# Patient Record
Sex: FEMALE | Race: WHITE | NOT HISPANIC OR LATINO | URBAN - METROPOLITAN AREA
[De-identification: names, ages, dates, MRNs, and addresses within clinical notes are randomized per-mention and may not be internally consistent; named-entity substitution may affect disease eponyms.]

---

## 2021-03-09 ENCOUNTER — OFFICE VISIT (OUTPATIENT)
Dept: URBAN - METROPOLITAN AREA CLINIC 72 | Facility: CLINIC | Age: 56
End: 2021-03-09

## 2021-03-09 NOTE — HPI-TODAY'S VISIT:
Ms. Marshall is a 55-year-old female who presents for consultation for ulcerative colitis, she was referred by Guille Zaman PA-C.  Her past medical history is notable for ulcerative colitis, anxiety, hiatal hernia and GERD.  She is currently maintained on omeprazole 40 mg daily and mesalamine 2.4 g daily.  Lab work from PCP to 4/20/21 Hemoglobin 10.8 (11.5 2/2019) Platelet 408  normal CMP

## 2021-10-19 ENCOUNTER — OFFICE VISIT (OUTPATIENT)
Dept: URBAN - METROPOLITAN AREA CLINIC 72 | Facility: CLINIC | Age: 56
End: 2021-10-19

## 2021-10-19 PROBLEM — 64766004: Status: ACTIVE | Noted: 2021-03-09

## 2021-11-09 ENCOUNTER — OFFICE VISIT (OUTPATIENT)
Dept: URBAN - METROPOLITAN AREA CLINIC 72 | Facility: CLINIC | Age: 56
End: 2021-11-09

## 2021-11-09 RX ORDER — ESTRADIOL 1 MG/1
1 TABLET TABLET ORAL ONCE A DAY
Status: ACTIVE | COMMUNITY

## 2021-11-09 RX ORDER — MESALAMINE 1.2 G/1
2 TABLETS TABLET, DELAYED RELEASE ORAL ONCE A DAY
Status: ACTIVE | COMMUNITY

## 2021-11-09 RX ORDER — BACLOFEN 10 MG/1
1 TABLET AS NEEDED TABLET ORAL TWICE A DAY
Status: ACTIVE | COMMUNITY

## 2021-11-09 RX ORDER — VENLAFAXINE HYDROCHLORIDE 75 MG/1
1 TABLET WITH FOOD TABLET ORAL ONCE A DAY
Status: ACTIVE | COMMUNITY

## 2021-11-09 RX ORDER — OMEPRAZOLE 40 MG/1
1 CAPSULE 30 MINUTES BEFORE MORNING MEAL CAPSULE, DELAYED RELEASE ORAL ONCE A DAY
Status: ACTIVE | COMMUNITY

## 2021-11-09 RX ORDER — LOVASTATIN 20 MG/1
1 TABLET WITH THE EVENING MEAL TABLET ORAL ONCE A DAY
Status: ACTIVE | COMMUNITY

## 2021-11-09 NOTE — HPI-TODAY'S VISIT:
Mrs. Marshall is a 56-year-old female who was referred by TRAYC Blas for colon cancer screening history of ulcerative colitis and GERD.  She has a past medical history of depression, hyperlipidemia and GERD.  Also has hiatal hernia status post cholecystectomy.

## 2022-04-05 ENCOUNTER — OFFICE VISIT (OUTPATIENT)
Dept: URBAN - METROPOLITAN AREA CLINIC 113 | Facility: CLINIC | Age: 57
End: 2022-04-05

## 2022-04-05 RX ORDER — OMEPRAZOLE 40 MG/1
1 CAPSULE 30 MINUTES BEFORE MORNING MEAL CAPSULE, DELAYED RELEASE ORAL ONCE A DAY
Status: ACTIVE | COMMUNITY

## 2022-04-05 RX ORDER — MESALAMINE 1.2 G/1
2 TABLETS TABLET, DELAYED RELEASE ORAL ONCE A DAY
Status: ACTIVE | COMMUNITY

## 2022-04-05 RX ORDER — VENLAFAXINE HYDROCHLORIDE 75 MG/1
1 TABLET WITH FOOD TABLET ORAL ONCE A DAY
Status: ACTIVE | COMMUNITY

## 2022-04-05 RX ORDER — LOVASTATIN 20 MG/1
1 TABLET WITH THE EVENING MEAL TABLET ORAL ONCE A DAY
Status: ACTIVE | COMMUNITY

## 2022-04-05 RX ORDER — BACLOFEN 10 MG/1
1 TABLET AS NEEDED TABLET ORAL TWICE A DAY
Status: ACTIVE | COMMUNITY

## 2022-04-05 RX ORDER — ESTRADIOL 1 MG/1
1 TABLET TABLET ORAL ONCE A DAY
Status: ACTIVE | COMMUNITY

## 2022-05-24 ENCOUNTER — OFFICE VISIT (OUTPATIENT)
Dept: URBAN - METROPOLITAN AREA CLINIC 72 | Facility: CLINIC | Age: 57
End: 2022-05-24

## 2022-05-24 RX ORDER — ESTRADIOL 1 MG/1
1 TABLET TABLET ORAL ONCE A DAY
Status: ACTIVE | COMMUNITY

## 2022-05-24 RX ORDER — BACLOFEN 10 MG/1
1 TABLET AS NEEDED TABLET ORAL TWICE A DAY
Status: ACTIVE | COMMUNITY

## 2022-05-24 RX ORDER — VENLAFAXINE HYDROCHLORIDE 75 MG/1
1 TABLET WITH FOOD TABLET ORAL ONCE A DAY
Status: ACTIVE | COMMUNITY

## 2022-05-24 RX ORDER — MESALAMINE 1.2 G/1
2 TABLETS TABLET, DELAYED RELEASE ORAL ONCE A DAY
Status: ACTIVE | COMMUNITY

## 2022-05-24 RX ORDER — LOVASTATIN 20 MG/1
1 TABLET WITH THE EVENING MEAL TABLET ORAL ONCE A DAY
Status: ACTIVE | COMMUNITY

## 2022-05-24 RX ORDER — OMEPRAZOLE 40 MG/1
1 CAPSULE 30 MINUTES BEFORE MORNING MEAL CAPSULE, DELAYED RELEASE ORAL ONCE A DAY
Status: ACTIVE | COMMUNITY

## 2022-05-24 NOTE — HPI-TODAY'S VISIT:
Mrs. Marshall is a pleasant 57-year-old who presents as a new patient for consultation for GERD.  She was referred by Dr. Cliff Ndiaye, a copy of this consultation will be forwarded to the referring physician.  She has a past medical history of reflux, hypertension and chronic abdominal pain he has been maintained on pantoprazole.

## 2022-07-20 ENCOUNTER — OFFICE VISIT (OUTPATIENT)
Dept: URBAN - METROPOLITAN AREA CLINIC 72 | Facility: CLINIC | Age: 57
End: 2022-07-20

## 2022-07-20 RX ORDER — ESTRADIOL 1 MG/1
1 TABLET TABLET ORAL ONCE A DAY
Status: ACTIVE | COMMUNITY

## 2022-07-20 RX ORDER — BACLOFEN 10 MG/1
1 TABLET AS NEEDED TABLET ORAL TWICE A DAY
Status: ACTIVE | COMMUNITY

## 2022-07-20 RX ORDER — LOVASTATIN 20 MG/1
1 TABLET WITH THE EVENING MEAL TABLET ORAL ONCE A DAY
Status: ACTIVE | COMMUNITY

## 2022-07-20 RX ORDER — MESALAMINE 1.2 G/1
2 TABLETS TABLET, DELAYED RELEASE ORAL ONCE A DAY
Status: ACTIVE | COMMUNITY

## 2022-07-20 RX ORDER — VENLAFAXINE HYDROCHLORIDE 75 MG/1
1 TABLET WITH FOOD TABLET ORAL ONCE A DAY
Status: ACTIVE | COMMUNITY

## 2022-07-20 RX ORDER — OMEPRAZOLE 40 MG/1
1 CAPSULE 30 MINUTES BEFORE MORNING MEAL CAPSULE, DELAYED RELEASE ORAL ONCE A DAY
Status: ACTIVE | COMMUNITY

## 2023-05-17 ENCOUNTER — OFFICE VISIT (OUTPATIENT)
Dept: URBAN - METROPOLITAN AREA CLINIC 72 | Facility: CLINIC | Age: 58
End: 2023-05-17

## 2023-06-01 ENCOUNTER — WEB ENCOUNTER (OUTPATIENT)
Dept: URBAN - METROPOLITAN AREA CLINIC 72 | Facility: CLINIC | Age: 58
End: 2023-06-01

## 2023-06-01 ENCOUNTER — DASHBOARD ENCOUNTERS (OUTPATIENT)
Age: 58
End: 2023-06-01

## 2023-06-01 ENCOUNTER — LAB OUTSIDE AN ENCOUNTER (OUTPATIENT)
Dept: URBAN - METROPOLITAN AREA CLINIC 72 | Facility: CLINIC | Age: 58
End: 2023-06-01

## 2023-06-01 ENCOUNTER — OFFICE VISIT (OUTPATIENT)
Dept: URBAN - METROPOLITAN AREA CLINIC 72 | Facility: CLINIC | Age: 58
End: 2023-06-01
Payer: COMMERCIAL

## 2023-06-01 VITALS
TEMPERATURE: 97.3 F | HEIGHT: 65 IN | DIASTOLIC BLOOD PRESSURE: 67 MMHG | SYSTOLIC BLOOD PRESSURE: 118 MMHG | HEART RATE: 75 BPM | WEIGHT: 218 LBS | BODY MASS INDEX: 36.32 KG/M2

## 2023-06-01 DIAGNOSIS — K22.4 ESOPHAGEAL SPASM: ICD-10-CM

## 2023-06-01 DIAGNOSIS — K51.80 CHRONIC PANCOLONIC ULCERATIVE COLITIS: ICD-10-CM

## 2023-06-01 DIAGNOSIS — K21.00 GASTROESOPHAGEAL REFLUX DISEASE WITH ESOPHAGITIS WITHOUT HEMORRHAGE: ICD-10-CM

## 2023-06-01 DIAGNOSIS — D50.8 ACQUIRED IRON DEFICIENCY ANEMIA DUE TO DECREASED ABSORPTION: ICD-10-CM

## 2023-06-01 PROBLEM — 266433003: Status: ACTIVE | Noted: 2023-06-01

## 2023-06-01 PROBLEM — 266434009: Status: ACTIVE | Noted: 2023-06-01

## 2023-06-01 PROBLEM — 87522002: Status: ACTIVE | Noted: 2023-06-01

## 2023-06-01 PROCEDURE — 99204 OFFICE O/P NEW MOD 45 MIN: CPT | Performed by: INTERNAL MEDICINE

## 2023-06-01 RX ORDER — VENLAFAXINE HYDROCHLORIDE 75 MG/1
1 TABLET WITH FOOD TABLET ORAL ONCE A DAY
Status: ACTIVE | COMMUNITY

## 2023-06-01 RX ORDER — BACLOFEN 10 MG/1
1 TABLET AS NEEDED TABLET ORAL TWICE A DAY
Qty: 180 | Refills: 1

## 2023-06-01 RX ORDER — LOVASTATIN 20 MG/1
1 TABLET WITH THE EVENING MEAL TABLET ORAL ONCE A DAY
Status: ACTIVE | COMMUNITY

## 2023-06-01 RX ORDER — OMEPRAZOLE 40 MG/1
1 CAPSULE 30 MINUTES BEFORE MORNING AND EVENING MEAL CAPSULE, DELAYED RELEASE ORAL TWICE A DAY
Qty: 180 | Refills: 1

## 2023-06-01 RX ORDER — OMEPRAZOLE 40 MG/1
1 CAPSULE 30 MINUTES BEFORE MORNING MEAL CAPSULE, DELAYED RELEASE ORAL ONCE A DAY
Status: ACTIVE | COMMUNITY

## 2023-06-01 RX ORDER — TIZANIDINE 4 MG/1
TABLET ORAL
Qty: 30 TABLET | Status: ON HOLD | COMMUNITY

## 2023-06-01 RX ORDER — VENLAFAXINE HYDROCHLORIDE 75 MG/1
CAPSULE, EXTENDED RELEASE ORAL
Qty: 90 CAPSULE | Status: ACTIVE | COMMUNITY

## 2023-06-01 RX ORDER — MESALAMINE 1.2 G/1
TABLET, DELAYED RELEASE ORAL
Qty: 60 TABLET | Status: ON HOLD | COMMUNITY

## 2023-06-01 RX ORDER — MESALAMINE 1.2 G/1
2 TABLETS TABLET, DELAYED RELEASE ORAL ONCE A DAY
Qty: 180 | Refills: 1

## 2023-06-01 RX ORDER — PREDNISONE 10 MG/1
TABLET ORAL
Qty: 48 TABLET | Status: ON HOLD | COMMUNITY

## 2023-06-01 RX ORDER — MESALAMINE 1.2 G/1
2 TABLETS TABLET, DELAYED RELEASE ORAL ONCE A DAY
Status: ACTIVE | COMMUNITY

## 2023-06-01 RX ORDER — ESTRADIOL 1 MG/1
1 TABLET TABLET ORAL ONCE A DAY
Status: ACTIVE | COMMUNITY

## 2023-06-01 RX ORDER — BACLOFEN 10 MG/1
1 TABLET AS NEEDED TABLET ORAL TWICE A DAY
Status: ACTIVE | COMMUNITY

## 2023-06-01 NOTE — HPI-TODAY'S VISIT:
58-year-old who referred by Dr. Campoverde 3/22/2023 for history of ulcerative colitis.  Also has AMY and GERD.   A copy of this note will be sent to the referring provider.  Originally had an appointment 3/9/2021.  Was a No-show to that appointment and has had 4 no-shows since into appointments rescheduled.  Her past medical history is notable for ulcerative colitis, anxiety, hiatal hernia and GERD.  She is currently maintained on omeprazole 40 mg daily and mesalamine 2.4 g daily.  Labs 3/9/2023 iron saturation 5.4%, total iron 25, hemoglobin 9.8, MCV 88.9 and platelet 444.  CMP: Normal.  Ferritin low at 4.1.  TSH normal.  B12 normal.  Lipid panel: Triglycerides 221, HDL 57 otherwise normal Labs 5/24/2023.  CBC: Hgb 12.2, WBC 12.35, RDW 16.5, neutrophils 9.  Previously seeing Dr. Keith.  Last EGD/colon 5 years ago.  Hx of Barretts 20 years ago.    On interview today she denies hematochezia and melena but reports some mucous.  2-5 BM's a day Type 4-6 on the [place] scale.  Has some upper abdominal discomfort more central and LUQ which she states has been consistent with her areas of UC in the past.  Does have some issues waking up at night feeling like she is choking.  She does sleep with her head upright.  Takes Baclofen for esophageal spasms.  Has had iron infusions recently.  No CP, SOB, palpitations, syncope, near-syncope or problems with anesthesia previously.

## 2023-08-16 ENCOUNTER — TELEPHONE ENCOUNTER (OUTPATIENT)
Dept: URBAN - METROPOLITAN AREA CLINIC 72 | Facility: CLINIC | Age: 58
End: 2023-08-16

## 2023-08-21 ENCOUNTER — OFFICE VISIT (OUTPATIENT)
Dept: URBAN - METROPOLITAN AREA MEDICAL CENTER 40 | Facility: MEDICAL CENTER | Age: 58
End: 2023-08-21

## 2023-08-21 ENCOUNTER — CLAIMS CREATED FROM THE CLAIM WINDOW (OUTPATIENT)
Dept: URBAN - METROPOLITAN AREA MEDICAL CENTER 40 | Facility: MEDICAL CENTER | Age: 58
End: 2023-08-21
Payer: COMMERCIAL

## 2023-08-21 DIAGNOSIS — K29.60 OTHER GASTRITIS WITHOUT BLEEDING: ICD-10-CM

## 2023-08-21 DIAGNOSIS — K51.90 ULCERATIVE COLITIS WITHOUT COMPLICATIONS, UNSPECIFIED LOCATION: ICD-10-CM

## 2023-08-21 DIAGNOSIS — K31.7 GASTRIC POLYPS: ICD-10-CM

## 2023-08-21 DIAGNOSIS — K63.5 BENIGN COLON POLYP: ICD-10-CM

## 2023-08-21 DIAGNOSIS — K21.9 ACID REFLUX: ICD-10-CM

## 2023-08-21 DIAGNOSIS — K22.4 ESOPHAGEAL SPASM: ICD-10-CM

## 2023-08-21 DIAGNOSIS — K52.89 OTHER AND UNSPECIFIED NONINFECTIOUS GASTROENTERITIS AND COLITIS: ICD-10-CM

## 2023-08-21 DIAGNOSIS — K51.40 INFLAMMATORY POLYP OF COLON: ICD-10-CM

## 2023-08-21 DIAGNOSIS — D50.9 IRON DEFICIENCY ANEMIA, UNSPECIFIED IRON DEFICIENCY ANEMIA TYPE: ICD-10-CM

## 2023-08-21 PROCEDURE — 43239 EGD BIOPSY SINGLE/MULTIPLE: CPT | Performed by: INTERNAL MEDICINE

## 2023-08-21 PROCEDURE — 45380 COLONOSCOPY AND BIOPSY: CPT | Performed by: INTERNAL MEDICINE

## 2023-08-21 RX ORDER — VENLAFAXINE HYDROCHLORIDE 75 MG/1
1 TABLET WITH FOOD TABLET ORAL ONCE A DAY
Status: ACTIVE | COMMUNITY

## 2023-08-21 RX ORDER — VENLAFAXINE HYDROCHLORIDE 75 MG/1
CAPSULE, EXTENDED RELEASE ORAL
Qty: 90 CAPSULE | Status: ACTIVE | COMMUNITY

## 2023-08-21 RX ORDER — BACLOFEN 10 MG/1
1 TABLET AS NEEDED TABLET ORAL TWICE A DAY
Qty: 180 | Refills: 1 | Status: ACTIVE | COMMUNITY

## 2023-08-21 RX ORDER — MESALAMINE 1.2 G/1
2 TABLETS TABLET, DELAYED RELEASE ORAL ONCE A DAY
Qty: 180 | Refills: 1 | Status: ACTIVE | COMMUNITY

## 2023-08-21 RX ORDER — OMEPRAZOLE 40 MG/1
1 CAPSULE 30 MINUTES BEFORE MORNING AND EVENING MEAL CAPSULE, DELAYED RELEASE ORAL TWICE A DAY
Qty: 180 | Refills: 1 | Status: ACTIVE | COMMUNITY

## 2023-08-21 RX ORDER — MESALAMINE 1.2 G/1
TABLET, DELAYED RELEASE ORAL
Qty: 60 TABLET | Status: ON HOLD | COMMUNITY

## 2023-08-21 RX ORDER — ESTRADIOL 1 MG/1
1 TABLET TABLET ORAL ONCE A DAY
Status: ACTIVE | COMMUNITY

## 2023-08-21 RX ORDER — PREDNISONE 10 MG/1
TABLET ORAL
Qty: 48 TABLET | Status: ON HOLD | COMMUNITY

## 2023-08-21 RX ORDER — TIZANIDINE 4 MG/1
TABLET ORAL
Qty: 30 TABLET | Status: ON HOLD | COMMUNITY

## 2023-08-21 RX ORDER — LOVASTATIN 20 MG/1
1 TABLET WITH THE EVENING MEAL TABLET ORAL ONCE A DAY
Status: ACTIVE | COMMUNITY

## 2023-08-25 ENCOUNTER — WEB ENCOUNTER (OUTPATIENT)
Dept: URBAN - METROPOLITAN AREA CLINIC 72 | Facility: CLINIC | Age: 58
End: 2023-08-25

## 2023-08-25 RX ORDER — MESALAMINE 1.2 G/1
2 TABLETS WITH A MEAL TABLET, DELAYED RELEASE ORAL ONCE A DAY
Qty: 180 TABLET | Refills: 1 | OUTPATIENT
Start: 2023-08-29 | End: 2024-02-24

## 2023-09-08 ENCOUNTER — OFFICE VISIT (OUTPATIENT)
Dept: URBAN - METROPOLITAN AREA CLINIC 72 | Facility: CLINIC | Age: 58
End: 2023-09-08

## 2023-09-08 RX ORDER — LOVASTATIN 20 MG/1
1 TABLET WITH THE EVENING MEAL TABLET ORAL ONCE A DAY
Status: ACTIVE | COMMUNITY

## 2023-09-08 RX ORDER — VENLAFAXINE HYDROCHLORIDE 75 MG/1
1 TABLET WITH FOOD TABLET ORAL ONCE A DAY
Status: ACTIVE | COMMUNITY

## 2023-09-08 RX ORDER — VENLAFAXINE HYDROCHLORIDE 75 MG/1
CAPSULE, EXTENDED RELEASE ORAL
Qty: 90 CAPSULE | Status: ACTIVE | COMMUNITY

## 2023-09-08 RX ORDER — ESTRADIOL 1 MG/1
1 TABLET TABLET ORAL ONCE A DAY
Status: ACTIVE | COMMUNITY

## 2023-09-08 RX ORDER — BACLOFEN 10 MG/1
1 TABLET AS NEEDED TABLET ORAL TWICE A DAY
Qty: 180 | Refills: 1 | Status: ACTIVE | COMMUNITY

## 2023-09-08 RX ORDER — MESALAMINE 1.2 G/1
2 TABLETS WITH A MEAL TABLET, DELAYED RELEASE ORAL ONCE A DAY
Qty: 180 TABLET | Refills: 1 | Status: ACTIVE | COMMUNITY
Start: 2023-08-29 | End: 2024-02-24

## 2023-09-08 RX ORDER — MESALAMINE 1.2 G/1
2 TABLETS TABLET, DELAYED RELEASE ORAL ONCE A DAY
Qty: 180 | Refills: 1 | Status: ACTIVE | COMMUNITY

## 2023-09-08 RX ORDER — TIZANIDINE 4 MG/1
TABLET ORAL
Qty: 30 TABLET | Status: ON HOLD | COMMUNITY

## 2023-09-08 RX ORDER — PREDNISONE 10 MG/1
TABLET ORAL
Qty: 48 TABLET | Status: ON HOLD | COMMUNITY

## 2023-09-08 RX ORDER — OMEPRAZOLE 40 MG/1
1 CAPSULE 30 MINUTES BEFORE MORNING AND EVENING MEAL CAPSULE, DELAYED RELEASE ORAL TWICE A DAY
Qty: 180 | Refills: 1 | Status: ACTIVE | COMMUNITY

## 2023-09-08 RX ORDER — MESALAMINE 1.2 G/1
TABLET, DELAYED RELEASE ORAL
Qty: 60 TABLET | Status: ON HOLD | COMMUNITY

## 2023-09-08 NOTE — HPI-TODAY'S VISIT:
58-year-old w here for EGD/colonoscopy follow-up.    Last seen 6/1/2023 for ulcerative colitis, AMY, GERD, esophageal spasm and abdominal pain.  EGD and colonoscopy ordered for further evaluation.  Her mesalamine was refilled.  Baclofen refilled for esophageal spasm as well as omeprazole 40 mg daily for GERD.  EGD/colonoscopy 8/21/2023.  EGD revealed 3 cm fixed hiatal hernia, 3 mm gastric polyp which was removed.  EGD otherwise normal.  Colonoscopy revealed mild mucosal changes suggestive of mild colitis or inactive colitis.  Multiple biopsies performed.  2 mm sessile sigmoid polyp which was removed.  15 cm there were inflammatory/pseudopolyps that were biopsied.  Also mild inflammation in that area.  Recommend repeat colonoscopy in 2 years.  Duodenal biopsy revealed intraepithelial lymphocytosis with intact villous architecture possible medication side effect versus inflammatory conditions such as celiac disease.  Stomach biopsy revealed mild chronic inactive gastritis no H. pylori.  Esophageal biopsy gastric type Culmer mucosa otherwise normal.  Biopsies at 80, 70, 60, 50, 40, 30 and 20 cm revealed no significant pathologic change.  Repeat colon biopsy at 20 cm did reveal mildly active chronic colitis.  Sigmoid polyp was polypoid mucosa.  Rectum polyp was inflammatory polyp.  No CMV.  Celiac testing recommended  Her past medical history is notable for ulcerative colitis, anxiety, hiatal hernia and GERD.  She is currently maintained on omeprazole 40 mg daily and mesalamine 2.4 g daily.  Labs 3/9/2023 iron saturation 5.4%, total iron 25, hemoglobin 9.8, MCV 88.9 and platelet 444.  CMP: Normal.  Ferritin low at 4.1.  TSH normal.  B12 normal.  Lipid panel: Triglycerides 221, HDL 57 otherwise normal Labs 5/24/2023.  CBC: Hgb 12.2, WBC 12.35, RDW 16.5, neutrophils 9.  Previously seeing Dr. Keith.  Last EGD/colon 5 years ago.  Hx of Barretts 20 years ago.    Last note:  On interview today she denies hematochezia and melena but reports some mucous.  2-5 BM's a day Type 4-6 on the [place] scale.  Has some upper abdominal discomfort more central and LUQ which she states has been consistent with her areas of UC in the past.  Does have some issues waking up at night feeling like she is choking.  She does sleep with her head upright.  Takes Baclofen for esophageal spasms.  Has had iron infusions recently.  No CP, SOB, palpitations, syncope, near-syncope or problems with anesthesia previously.

## 2023-09-20 ENCOUNTER — OFFICE VISIT (OUTPATIENT)
Dept: URBAN - METROPOLITAN AREA CLINIC 72 | Facility: CLINIC | Age: 58
End: 2023-09-20

## 2023-09-20 RX ORDER — TIZANIDINE 4 MG/1
TABLET ORAL
Qty: 30 TABLET | Status: ON HOLD | COMMUNITY

## 2023-09-20 RX ORDER — MESALAMINE 1.2 G/1
2 TABLETS WITH A MEAL TABLET, DELAYED RELEASE ORAL ONCE A DAY
Qty: 180 TABLET | Refills: 1 | Status: ACTIVE | COMMUNITY
Start: 2023-08-29 | End: 2024-02-24

## 2023-09-20 RX ORDER — PREDNISONE 10 MG/1
TABLET ORAL
Qty: 48 TABLET | Status: ON HOLD | COMMUNITY

## 2023-09-20 RX ORDER — VENLAFAXINE HYDROCHLORIDE 75 MG/1
CAPSULE, EXTENDED RELEASE ORAL
Qty: 90 CAPSULE | Status: ACTIVE | COMMUNITY

## 2023-09-20 RX ORDER — VENLAFAXINE HYDROCHLORIDE 75 MG/1
1 TABLET WITH FOOD TABLET ORAL ONCE A DAY
Status: ACTIVE | COMMUNITY

## 2023-09-20 RX ORDER — OMEPRAZOLE 40 MG/1
1 CAPSULE 30 MINUTES BEFORE MORNING AND EVENING MEAL CAPSULE, DELAYED RELEASE ORAL TWICE A DAY
Qty: 180 | Refills: 1 | Status: ACTIVE | COMMUNITY

## 2023-09-20 RX ORDER — BACLOFEN 10 MG/1
1 TABLET AS NEEDED TABLET ORAL TWICE A DAY
Qty: 180 | Refills: 1 | Status: ACTIVE | COMMUNITY

## 2023-09-20 RX ORDER — LOVASTATIN 20 MG/1
1 TABLET WITH THE EVENING MEAL TABLET ORAL ONCE A DAY
Status: ACTIVE | COMMUNITY

## 2023-09-20 RX ORDER — MESALAMINE 1.2 G/1
2 TABLETS TABLET, DELAYED RELEASE ORAL ONCE A DAY
Qty: 180 | Refills: 1 | Status: ACTIVE | COMMUNITY

## 2023-09-20 RX ORDER — ESTRADIOL 1 MG/1
1 TABLET TABLET ORAL ONCE A DAY
Status: ACTIVE | COMMUNITY

## 2023-09-20 RX ORDER — MESALAMINE 1.2 G/1
TABLET, DELAYED RELEASE ORAL
Qty: 60 TABLET | Status: ON HOLD | COMMUNITY

## 2023-09-20 NOTE — HPI-TODAY'S VISIT:
58-year-old w here for EGD/colonoscopy follow-up.    Last seen 6/1/2023 for ulcerative colitis, AMY, GERD, esophageal spasm and abdominal pain.  EGD and colonoscopy ordered for further evaluation.  Her mesalamine was refilled.  Baclofen refilled for esophageal spasm as well as omeprazole 40 mg daily for GERD.  EGD/colonoscopy 8/21/2023.  EGD revealed 3 cm fixed hiatal hernia, 3 mm gastric polyp which was removed.  EGD otherwise normal.  Colonoscopy revealed mild mucosal changes suggestive of mild colitis or inactive colitis.  Multiple biopsies performed.  2 mm sessile sigmoid polyp which was removed.  15 cm there were inflammatory/pseudopolyps that were biopsied.  Also mild inflammation in that area.  Recommend repeat colonoscopy in 2 years.  Duodenal biopsy revealed intraepithelial lymphocytosis with intact villous architecture possible medication side effect versus inflammatory conditions such as celiac disease.  Stomach biopsy revealed mild chronic inactive gastritis no H. pylori.  Esophageal biopsy gastric type Culmer mucosa otherwise normal.  Biopsies at 80, 70, 60, 50, 40, 30 and 20 cm revealed no significant pathologic change.  Repeat colon biopsy at 20 cm did reveal mildly active chronic colitis.  Sigmoid polyp was polypoid mucosa.  Rectum polyp was inflammatory polyp.  No CMV.  Celiac testing recommended  Her past medical history is notable for ulcerative colitis, anxiety, hiatal hernia and GERD.  She is currently maintained on omeprazole 40 mg daily and mesalamine 2.4 g daily.  Labs 3/9/2023 iron saturation 5.4%, total iron 25, hemoglobin 9.8, MCV 88.9 and platelet 444.  CMP: Normal.  Ferritin low at 4.1.  TSH normal.  B12 normal.  Lipid panel: Triglycerides 221, HDL 57 otherwise normal Labs 5/24/2023.  CBC: Hgb 12.2, WBC 12.35, RDW 16.5, neutrophils 9.  Previously seeing Dr. Keith.  Last EGD/colon 5 years ago.  Hx of Barretts 20 years ago.    Last note:  On interview today she denies hematochezia and melena but reports some mucous.  2-5 BM's a day Type 4-6 on the [place] scale.  Has some upper abdominal discomfort more central and LUQ which she states has been consistent with her areas of UC in the past.  Does have some issues waking up at night feeling like she is choking.  She does sleep with her head upright.  Takes Baclofen for esophageal spasms.  Has had iron infusions recently.  No CP, SOB, palpitations, syncope, near-syncope or problems with anesthesia previously.

## 2024-05-28 ENCOUNTER — ERX REFILL RESPONSE (OUTPATIENT)
Dept: URBAN - METROPOLITAN AREA CLINIC 72 | Facility: CLINIC | Age: 59
End: 2024-05-28

## 2024-05-28 RX ORDER — SUCRALFATE 1 G/1
TAKE 1 TABLET BY MOUTH 1 HOUR BEFORE MEALS AND AT BEDTIME ON AN EMPTY STOMACH TABLET ORAL
Qty: 120 TABLET | Refills: 0 | OUTPATIENT

## 2024-06-25 ENCOUNTER — ERX REFILL RESPONSE (OUTPATIENT)
Dept: URBAN - METROPOLITAN AREA CLINIC 72 | Facility: CLINIC | Age: 59
End: 2024-06-25

## 2024-06-25 RX ORDER — MESALAMINE 1.2 G/1
TAKE 2 TABLETS BY MOUTH EVERY DAY WITH A MEAL TABLET, DELAYED RELEASE ORAL
Qty: 180 TABLET | Refills: 0 | OUTPATIENT

## 2024-08-09 ENCOUNTER — TELEPHONE ENCOUNTER (OUTPATIENT)
Dept: URBAN - METROPOLITAN AREA CLINIC 72 | Facility: CLINIC | Age: 59
End: 2024-08-09

## 2024-08-09 RX ORDER — BACLOFEN 10 MG/1
1 TABLET AS NEEDED TABLET ORAL TWICE A DAY
Qty: 180 | Refills: 1
End: 2025-02-05

## 2024-08-15 ENCOUNTER — OFFICE VISIT (OUTPATIENT)
Dept: URBAN - METROPOLITAN AREA CLINIC 72 | Facility: CLINIC | Age: 59
End: 2024-08-15

## 2024-08-15 VITALS — HEIGHT: 65 IN

## 2024-08-15 RX ORDER — BACLOFEN 10 MG/1
1 TABLET AS NEEDED TABLET ORAL TWICE A DAY
Qty: 180 | Refills: 1 | Status: ACTIVE | COMMUNITY
End: 2025-02-05

## 2024-08-15 RX ORDER — LOVASTATIN 20 MG/1
1 TABLET WITH THE EVENING MEAL TABLET ORAL ONCE A DAY
Status: ACTIVE | COMMUNITY

## 2024-08-15 RX ORDER — ESTRADIOL 1 MG/1
1 TABLET TABLET ORAL ONCE A DAY
Status: ACTIVE | COMMUNITY

## 2024-08-15 RX ORDER — MESALAMINE 1.2 G/1
TAKE 2 TABLETS BY MOUTH EVERY DAY WITH A MEAL TABLET, DELAYED RELEASE ORAL
Qty: 180 TABLET | Refills: 0 | Status: ACTIVE | COMMUNITY

## 2024-08-15 RX ORDER — VENLAFAXINE HYDROCHLORIDE 75 MG/1
1 TABLET WITH FOOD TABLET ORAL ONCE A DAY
Status: ACTIVE | COMMUNITY

## 2024-08-15 RX ORDER — OMEPRAZOLE 40 MG/1
1 CAPSULE 30 MINUTES BEFORE MORNING AND EVENING MEAL CAPSULE, DELAYED RELEASE ORAL TWICE A DAY
Qty: 180 | Refills: 1 | Status: ACTIVE | COMMUNITY

## 2024-08-15 RX ORDER — TIZANIDINE 4 MG/1
TABLET ORAL
Qty: 30 TABLET | Status: ON HOLD | COMMUNITY

## 2024-08-15 RX ORDER — VENLAFAXINE HYDROCHLORIDE 75 MG/1
CAPSULE, EXTENDED RELEASE ORAL
Qty: 90 CAPSULE | Status: ACTIVE | COMMUNITY

## 2024-08-15 RX ORDER — PREDNISONE 10 MG/1
TABLET ORAL
Qty: 48 TABLET | Status: ON HOLD | COMMUNITY

## 2024-08-15 RX ORDER — SUCRALFATE 1 G/1
TAKE 1 TABLET BY MOUTH 1 HOUR BEFORE MEALS AND AT BEDTIME ON AN EMPTY STOMACH TABLET ORAL
Qty: 120 TABLET | Refills: 0 | Status: ACTIVE | COMMUNITY

## 2024-08-15 NOTE — HPI-OTHER HISTORIES
Procedures:  EGD -8/21/2023 -normal duodenum. Normal stomach. Single gastric polyp. 3 cm fixed hiatal hernia. Esophagus normal. Z-line regular. Path: Mild chronic inactive gastritis. Intraepithelial lymphocytosis with intact villous architecture seen in duodenum. Could be related to symptomatic or asymptomatic celiac disease or with NSAID use.  Colon -8/21/2023 -perianal and digital rectal examinations normal. Terminal ileum normal. Mild mucosal changes suggestive of either mild colitis or inactive colitis. Biopsies taken. At 15 cm there are inflammatory pseudopolyps. Mild inflammation seen at 10 cm. Normal rectum on retroflexion. Path: Chronic colitis seen at 20 cm as mildly active. All other biopsies normal. Repeat colonoscopy in 2 years. Due after 8/2025.

## 2024-08-15 NOTE — HPI-TODAY'S VISIT:
Patient is a  and GERD.  She has a history of ulcerative colitis59-year-old female here for her yearly visit and prescription refills.  She is on mesalamine 1.2 g tablet 2 tablets orally once daily.  She is on 40 mg of omeprazole daily.  She has requested sucralfate 4 times daily in the past as well.  Patient also on baclofen for esophageal spasm.

## 2024-08-19 ENCOUNTER — OFFICE VISIT (OUTPATIENT)
Dept: URBAN - METROPOLITAN AREA CLINIC 72 | Facility: CLINIC | Age: 59
End: 2024-08-19

## 2024-08-19 RX ORDER — LOVASTATIN 20 MG/1
1 TABLET WITH THE EVENING MEAL TABLET ORAL ONCE A DAY
Status: ACTIVE | COMMUNITY

## 2024-08-19 RX ORDER — PREDNISONE 10 MG/1
TABLET ORAL
Qty: 48 TABLET | Status: ON HOLD | COMMUNITY

## 2024-08-19 RX ORDER — OMEPRAZOLE 40 MG/1
1 CAPSULE 30 MINUTES BEFORE MORNING AND EVENING MEAL CAPSULE, DELAYED RELEASE ORAL TWICE A DAY
Qty: 180 | Refills: 1 | Status: ACTIVE | COMMUNITY

## 2024-08-19 RX ORDER — TIZANIDINE 4 MG/1
TABLET ORAL
Qty: 30 TABLET | Status: ON HOLD | COMMUNITY

## 2024-08-19 RX ORDER — VENLAFAXINE HYDROCHLORIDE 75 MG/1
1 TABLET WITH FOOD TABLET ORAL ONCE A DAY
Status: ACTIVE | COMMUNITY

## 2024-08-19 RX ORDER — VENLAFAXINE HYDROCHLORIDE 75 MG/1
CAPSULE, EXTENDED RELEASE ORAL
Qty: 90 CAPSULE | Status: ACTIVE | COMMUNITY

## 2024-08-19 RX ORDER — MESALAMINE 1.2 G/1
TAKE 2 TABLETS BY MOUTH EVERY DAY WITH A MEAL TABLET, DELAYED RELEASE ORAL
Qty: 180 TABLET | Refills: 0 | Status: ACTIVE | COMMUNITY

## 2024-08-19 RX ORDER — SUCRALFATE 1 G/1
TAKE 1 TABLET BY MOUTH 1 HOUR BEFORE MEALS AND AT BEDTIME ON AN EMPTY STOMACH TABLET ORAL
Qty: 120 TABLET | Refills: 0 | Status: ACTIVE | COMMUNITY

## 2024-08-19 RX ORDER — BACLOFEN 10 MG/1
1 TABLET AS NEEDED TABLET ORAL TWICE A DAY
Qty: 180 | Refills: 1 | Status: ACTIVE | COMMUNITY
End: 2025-02-05

## 2024-08-19 RX ORDER — ESTRADIOL 1 MG/1
1 TABLET TABLET ORAL ONCE A DAY
Status: ACTIVE | COMMUNITY

## 2024-08-19 NOTE — HPI-TODAY'S VISIT:
Patient is a 59-year-old female here for her yearly visit and prescription refills. and GERD. She has a history of ulcerative colitis and GERD. She is on mesalamine 1.2 g tablet 2 tablets orally once daily.  She is on 40 mg of omeprazole daily.  She has requested sucralfate 4 times daily in the past as well.  Patient also on baclofen for esophageal spasm.

## 2024-09-18 ENCOUNTER — OFFICE VISIT (OUTPATIENT)
Dept: URBAN - METROPOLITAN AREA CLINIC 72 | Facility: CLINIC | Age: 59
End: 2024-09-18

## 2025-05-20 ENCOUNTER — OFFICE VISIT (OUTPATIENT)
Dept: URBAN - METROPOLITAN AREA CLINIC 72 | Facility: CLINIC | Age: 60
End: 2025-05-20
Payer: COMMERCIAL

## 2025-05-20 ENCOUNTER — LAB OUTSIDE AN ENCOUNTER (OUTPATIENT)
Dept: URBAN - METROPOLITAN AREA CLINIC 72 | Facility: CLINIC | Age: 60
End: 2025-05-20

## 2025-05-20 DIAGNOSIS — K21.00 GASTROESOPHAGEAL REFLUX DISEASE WITH ESOPHAGITIS WITHOUT HEMORRHAGE: ICD-10-CM

## 2025-05-20 DIAGNOSIS — K51.30 ULCERATIVE RECTOSIGMOIDITIS WITHOUT COMPLICATION: ICD-10-CM

## 2025-05-20 DIAGNOSIS — R13.19 ESOPHAGEAL DYSPHAGIA: ICD-10-CM

## 2025-05-20 DIAGNOSIS — K22.4 ESOPHAGEAL SPASM: ICD-10-CM

## 2025-05-20 PROBLEM — 41364008: Status: ACTIVE | Noted: 2025-05-20

## 2025-05-20 PROBLEM — 40890009: Status: ACTIVE | Noted: 2025-05-20

## 2025-05-20 PROCEDURE — 99214 OFFICE O/P EST MOD 30 MIN: CPT | Performed by: INTERNAL MEDICINE

## 2025-05-20 RX ORDER — VENLAFAXINE HYDROCHLORIDE 75 MG/1
CAPSULE, EXTENDED RELEASE ORAL
Qty: 90 CAPSULE | Status: ON HOLD | COMMUNITY

## 2025-05-20 RX ORDER — LOVASTATIN 20 MG/1
1 TABLET WITH THE EVENING MEAL TABLET ORAL ONCE A DAY
Status: ACTIVE | COMMUNITY

## 2025-05-20 RX ORDER — TIZANIDINE 4 MG/1
TABLET ORAL
Qty: 30 TABLET | Status: ON HOLD | COMMUNITY

## 2025-05-20 RX ORDER — VENLAFAXINE HYDROCHLORIDE 75 MG/1
1 TABLET WITH FOOD TABLET ORAL ONCE A DAY
Status: ACTIVE | COMMUNITY

## 2025-05-20 RX ORDER — OMEPRAZOLE 40 MG/1
2 CAPSULES CAPSULE, DELAYED RELEASE ORAL ONCE A DAY
Qty: 180 CAPSULE | Refills: 1 | Status: ACTIVE | COMMUNITY

## 2025-05-20 RX ORDER — MESALAMINE 1.2 G/1
2 TABLETS WITH A MEAL TABLET, DELAYED RELEASE ORAL ONCE A DAY
Qty: 180 | Refills: 3 | OUTPATIENT
Start: 2025-05-20

## 2025-05-20 RX ORDER — ESTRADIOL 1 MG/1
1 TABLET TABLET ORAL ONCE A DAY
Status: ACTIVE | COMMUNITY

## 2025-05-20 RX ORDER — FAMOTIDINE 40 MG/1
1 TABLET AT BEDTIME TABLET, FILM COATED ORAL ONCE A DAY
Qty: 90 TABLET | Refills: 3 | OUTPATIENT
Start: 2025-05-20

## 2025-05-20 RX ORDER — BACLOFEN 10 MG/1
1 TABLET AS NEEDED TABLET ORAL ONCE A DAY
Qty: 90 TABLET | Refills: 1 | OUTPATIENT
Start: 2025-05-20

## 2025-05-20 RX ORDER — MESALAMINE 1.2 G/1
TAKE 2 TABLETS BY MOUTH EVERY DAY WITH A MEAL TABLET, DELAYED RELEASE ORAL
Qty: 180 TABLET | Refills: 0 | Status: ACTIVE | COMMUNITY

## 2025-05-20 RX ORDER — SUCRALFATE 1 G/1
TAKE 1 TABLET BY MOUTH 1 HOUR BEFORE MEALS AND AT BEDTIME ON AN EMPTY STOMACH TABLET ORAL
Qty: 120 TABLET | Refills: 0 | Status: ACTIVE | COMMUNITY

## 2025-05-20 RX ORDER — PREDNISONE 10 MG/1
TABLET ORAL
Qty: 48 TABLET | Status: ON HOLD | COMMUNITY

## 2025-05-20 RX ORDER — PANTOPRAZOLE SODIUM 40 MG/1
1 TABLET 1/2 TO 1 HOUR BEFORE BREAKFAST AND DINNER TABLET, DELAYED RELEASE ORAL TWICE A DAY
Qty: 60 | Refills: 1 | OUTPATIENT
Start: 2025-05-20

## 2025-05-20 RX ORDER — FAMOTIDINE 20 MG/1
1 TABLET AT BEDTIME AS NEEDED TABLET, FILM COATED ORAL ONCE A DAY
Status: ACTIVE | COMMUNITY

## 2025-05-20 NOTE — HPI-OTHER HISTORIES
Procedures: 8/21/2023-EGD/colonoscopy: - EGD: Examined duodenum normal. Biopsied. Stomach normal. Biopsied. Single gastric polyp 3 mm in size-removed. 3 cm fixed hiatal hernia. Esophagus normal. Z-line noted to be at 36 cm from the incisors, regular, biopsied. Path: Intraepithelial lymphocytosis with intact villous architecture on duodenal biopsy. Mild chronic inactive gastritis negative for H. pylori. Gastric polyp was hyperplastic. Esophageal biopsy showed gastric type columnar mucosa negative for intestinal metaplasia and dysplasia.  - Colonoscopy: Perianal and digital rectal examinations were normal. Terminal ileum was normal. Mild mucosal changes suggestive of either mild colitis or an active colitis. Biopsies taken at 80 cm, 70 cm, 60 cm, 50 cm, 40 cm, 30 cm, 20 cm, 10 cm. 2 mm sessile sigmoid polyp. At 15 cm there were inflammatory pseudopolyps. Mild inflammation seen in the area of the rectum biopsied at 10 cm. Normal rectum on retroflexion. Path: 20 cm through 80 cm biopsies showed no significant pathologic change. 20 cm biopsy showed chronic colitis, mildly active. Sigmoid biopsy showed benign colonic mucosa, clinically polypoid. Rectal polyp showed inflammatory polyp no evidence of cytomegalovirus.Repeat colonoscopy in 2 years. Due 8/2025.

## 2025-05-20 NOTE — HPI-TODAY'S VISIT:
Patient is a 60-year-old female last seen in the office on 6/1/2023 for ulcerative colitis, iron deficiency anemia, esophageal spasm, GERD, abdominal discomfort.  Patient takes Lialda 1.2 daily.  She follows with hematology for as needed iron infusions.  Patient was scheduled for EGD and colonoscopy.  She was given baclofen for esophageal spasms.  Patient is here because she has upper GI concerns and is requesting an EGD consult.  Patient is seen today and feels like food gets stuck. She is waking up in the middle of umm night choking. She is always throat clearing. She is taking omeprazole 40 mg BID, famotidine 20 mg nightly, and Sucralafate prn - QID. Her sx are not controlled. SHe has been on omeprazole for 20 years.   Bowels are moving regularly - twice daily - soft serve. No blood or mucus in the stool. No abd pain. She takes the Lialda daily and reports very well controlled.   FH - 3 grandparents (mGM, mGF, pGM) had colon cancer, and Kameron had Crohns that turned into cancer.

## 2025-06-25 ENCOUNTER — OFFICE VISIT (OUTPATIENT)
Dept: URBAN - METROPOLITAN AREA CLINIC 72 | Facility: CLINIC | Age: 60
End: 2025-06-25
Payer: COMMERCIAL

## 2025-06-25 DIAGNOSIS — K51.90 ULCERATIVE COLITIS WITHOUT COMPLICATIONS, UNSPECIFIED LOCATION: ICD-10-CM

## 2025-06-25 DIAGNOSIS — K21.00 GASTROESOPHAGEAL REFLUX DISEASE WITH ESOPHAGITIS WITHOUT HEMORRHAGE: ICD-10-CM

## 2025-06-25 PROCEDURE — 99213 OFFICE O/P EST LOW 20 MIN: CPT | Performed by: INTERNAL MEDICINE

## 2025-06-25 RX ORDER — VENLAFAXINE HYDROCHLORIDE 75 MG/1
CAPSULE, EXTENDED RELEASE ORAL
Qty: 90 CAPSULE | Status: ON HOLD | COMMUNITY

## 2025-06-25 RX ORDER — FAMOTIDINE 20 MG/1
1 TABLET AT BEDTIME AS NEEDED TABLET, FILM COATED ORAL ONCE A DAY
Status: ACTIVE | COMMUNITY

## 2025-06-25 RX ORDER — TIZANIDINE 4 MG/1
TABLET ORAL
Qty: 30 TABLET | Status: ON HOLD | COMMUNITY

## 2025-06-25 RX ORDER — MESALAMINE 1.2 G/1
TAKE 2 TABLETS BY MOUTH EVERY DAY WITH A MEAL TABLET, DELAYED RELEASE ORAL
Qty: 180 TABLET | Refills: 0 | Status: ACTIVE | COMMUNITY

## 2025-06-25 RX ORDER — SUCRALFATE 1 G/1
TAKE 1 TABLET BY MOUTH 1 HOUR BEFORE MEALS AND AT BEDTIME ON AN EMPTY STOMACH TABLET ORAL
Qty: 120 TABLET | Refills: 0 | Status: ACTIVE | COMMUNITY

## 2025-06-25 RX ORDER — ESTRADIOL 1 MG/1
1 TABLET TABLET ORAL ONCE A DAY
Status: ACTIVE | COMMUNITY

## 2025-06-25 RX ORDER — VENLAFAXINE HYDROCHLORIDE 75 MG/1
1 TABLET WITH FOOD TABLET ORAL ONCE A DAY
Status: ACTIVE | COMMUNITY

## 2025-06-25 RX ORDER — OMEPRAZOLE 40 MG/1
2 CAPSULES CAPSULE, DELAYED RELEASE ORAL ONCE A DAY
Qty: 180 CAPSULE | Refills: 1 | Status: ON HOLD | COMMUNITY

## 2025-06-25 RX ORDER — FAMOTIDINE 40 MG/1
1 TABLET AT BEDTIME TABLET, FILM COATED ORAL ONCE A DAY
Qty: 90 TABLET | Refills: 3 | Status: ACTIVE | COMMUNITY
Start: 2025-05-20

## 2025-06-25 RX ORDER — BACLOFEN 10 MG/1
1 TABLET AS NEEDED TABLET ORAL ONCE A DAY
Qty: 90 TABLET | Refills: 1 | Status: ACTIVE | COMMUNITY
Start: 2025-05-20

## 2025-06-25 RX ORDER — PREDNISONE 10 MG/1
TABLET ORAL
Qty: 48 TABLET | Status: ON HOLD | COMMUNITY

## 2025-06-25 RX ORDER — MESALAMINE 1.2 G/1
2 TABLETS WITH A MEAL TABLET, DELAYED RELEASE ORAL ONCE A DAY
Qty: 180 | Refills: 3 | Status: ACTIVE | COMMUNITY
Start: 2025-05-20

## 2025-06-25 RX ORDER — LOVASTATIN 20 MG/1
1 TABLET WITH THE EVENING MEAL TABLET ORAL ONCE A DAY
Status: ACTIVE | COMMUNITY

## 2025-06-25 RX ORDER — PANTOPRAZOLE SODIUM 40 MG/1
1 TABLET 1/2 TO 1 HOUR BEFORE BREAKFAST AND DINNER TABLET, DELAYED RELEASE ORAL TWICE A DAY
Qty: 60 | Refills: 1 | Status: ACTIVE | COMMUNITY
Start: 2025-05-20

## 2025-06-25 NOTE — HPI-TODAY'S VISIT:
Mrs. Marshall returns for follow-up.  Recall she is a 60-year-old female last seen in office on 5/20/2025.  She has a history of ulcerative colitis, GERD dysphagia and esophageal spasm.  Last office visit she was placed on pantoprazole 40 mg twice a day famotidine nightly and had a colonoscopy ordered.  Baclofen was given to her for esophageal spasm as well.  She was advised to continue her mesalamine which she had been on for her ulcerative colitis.  She had a significant bout of reflux the other night that left her feeling uncomfortable.  She felt as though acid was going into her lungs.  She has been taking the pantoprazole twice daily and the famotidine at night and sleeping with the head of the bed elevated.  She did eat late and does sometimes come into contact with foods that are provocative of her symptoms.  She has seen some bright red bleeding over the last few days but reports that she has been constipated, unclear if it is related to her colitis versus hemorrhoidal disease.  Procedures: 8/21/2023-EGD/colonoscopy:- EGD: Examined duodenum normal. Biopsied. Stomach normal. Biopsied. Single gastric polyp 3 mm in size-removed. 3 cm fixed hiatal hernia. Esophagus normal. Z-line noted to be at 36 cm from the incisors, regular, biopsied. Path: Intraepithelial lymphocytosis with intact villous architecture on duodenal biopsy. Mild chronic inactive gastritis negative for H. pylori. Gastric polyp was hyperplastic. Esophageal biopsy showed gastric type columnar mucosa negative for intestinal metaplasia and dysplasia. - Colonoscopy: Perianal and digital rectal examinations were normal. Terminal ileum was normal. Mild mucosal changes suggestive of either mild colitis or an active colitis. Biopsies taken at 80 cm, 70 cm, 60 cm, 50 cm, 40 cm, 30 cm, 20 cm, 10 cm. 2 mm sessile sigmoid polyp. At 15 cm there were inflammatory pseudopolyps. Mild inflammation seen in the area of the rectum biopsied at 10 cm. Normal rectum on retroflexion. Path: 20 cm through 80 cm biopsies showed no significant pathologic change. 20 cm biopsy showed chronic colitis, mildly active. Sigmoid biopsy showed benign colonic mucosa, clinically polypoid. Rectal polyp showed inflammatory polyp no evidence of cytomegalovirus.Repeat colonoscopy in 2 years. Due 8/2025.

## 2025-06-25 NOTE — EXAM-PHYSICAL EXAM
General- no acute distress, resting comfortably Eyes- anicteric, no pallor HENT- normocephalic, atraumatic head Neck- no lymphadenopathy, symmetric Chest- non labored breathing, equal rise Abdomen- soft, non tender, non distended, no organomegaly Ext: CL[place], no obvious sores or rashes

## 2025-06-26 ENCOUNTER — OFFICE VISIT (OUTPATIENT)
Dept: URBAN - METROPOLITAN AREA CLINIC 72 | Facility: CLINIC | Age: 60
End: 2025-06-26

## 2025-07-14 ENCOUNTER — ERX REFILL RESPONSE (OUTPATIENT)
Dept: URBAN - METROPOLITAN AREA CLINIC 72 | Facility: CLINIC | Age: 60
End: 2025-07-14

## 2025-07-14 RX ORDER — PANTOPRAZOLE SODIUM 40 MG/1
TAKE ONE (1) TABLET BY MOUTH ONE HALF (1/2) TO ONE (1) HOUR BEFORE BREAKFAST AND DINNER TWICE A DAY TABLET, DELAYED RELEASE ORAL
Qty: 60 TABLET | Refills: 2

## 2025-08-22 ENCOUNTER — TELEPHONE ENCOUNTER (OUTPATIENT)
Dept: URBAN - METROPOLITAN AREA CLINIC 72 | Facility: CLINIC | Age: 60
End: 2025-08-22

## 2025-08-22 ENCOUNTER — TELEPHONE ENCOUNTER (OUTPATIENT)
Dept: URBAN - METROPOLITAN AREA CLINIC 113 | Facility: CLINIC | Age: 60
End: 2025-08-22

## 2025-08-25 ENCOUNTER — OFFICE VISIT (OUTPATIENT)
Dept: URBAN - METROPOLITAN AREA MEDICAL CENTER 40 | Facility: MEDICAL CENTER | Age: 60
End: 2025-08-25